# Patient Record
Sex: FEMALE | Race: WHITE | NOT HISPANIC OR LATINO | Employment: STUDENT | URBAN - METROPOLITAN AREA
[De-identification: names, ages, dates, MRNs, and addresses within clinical notes are randomized per-mention and may not be internally consistent; named-entity substitution may affect disease eponyms.]

---

## 2023-03-23 LAB — ANTI-NUCLEAR ANTIBODY (ANA): NEGATIVE

## 2023-03-28 LAB — TRYPTASE: 3.8 MCG/L

## 2023-03-30 LAB — URTICARIA-INDUCING ACTIVITY: 2.3

## 2023-07-10 ENCOUNTER — TELEPHONE (OUTPATIENT)
Dept: PEDIATRICS | Facility: CLINIC | Age: 18
End: 2023-07-10

## 2023-07-10 NOTE — TELEPHONE ENCOUNTER
Ira was wondering if she could get a call with recommendation for an adult Dr as well as an allergist and a chiropractor.

## 2023-07-11 NOTE — TELEPHONE ENCOUNTER
PT CALLED WANTING REFERENCES FOR CHIROPRACTER AND ADULT PCP. LEFT MESSAGE THAT DR JACOBSON AT [facility] IS GOOD AND HE SEES PATIENTS AT [place] AND [place] LOCATIONS AND THE PHONE NUMBER [phone]. A PCP WOULD BE DR BRIGGS THAT PRACTICES IN [place] BUT DO NOT HAVE A PHONE NUMBER FOR HER. OR DR RAFFAELE MILES, A FP DOCTOR, IS IN PRACTICE IN [place] IN OFFICE BUILDING ON [address]OVE [employer] [facility]. IF YOU NEED FURTHER ASSISTANCE THEN CALL MY OFFICE BUT I WILL NOT BE BACK IN OFFICE UNTIL NEXT WEEK.

## 2024-08-19 ENCOUNTER — APPOINTMENT (OUTPATIENT)
Dept: OBSTETRICS AND GYNECOLOGY | Facility: CLINIC | Age: 19
End: 2024-08-19
Payer: COMMERCIAL

## 2024-08-19 VITALS
HEIGHT: 64 IN | BODY MASS INDEX: 34.01 KG/M2 | WEIGHT: 199.2 LBS | DIASTOLIC BLOOD PRESSURE: 72 MMHG | SYSTOLIC BLOOD PRESSURE: 116 MMHG

## 2024-08-19 DIAGNOSIS — Z30.46 NEXPLANON REMOVAL: Primary | ICD-10-CM

## 2024-08-19 PROCEDURE — 11982 REMOVE DRUG IMPLANT DEVICE: CPT | Performed by: STUDENT IN AN ORGANIZED HEALTH CARE EDUCATION/TRAINING PROGRAM

## 2024-08-19 RX ORDER — LIDOCAINE HYDROCHLORIDE 10 MG/ML
2 INJECTION, SOLUTION EPIDURAL; INFILTRATION; INTRACAUDAL; PERINEURAL ONCE
Status: COMPLETED | OUTPATIENT
Start: 2024-08-19 | End: 2024-08-19

## 2024-08-19 ASSESSMENT — PATIENT HEALTH QUESTIONNAIRE - PHQ9
SUM OF ALL RESPONSES TO PHQ9 QUESTIONS 1 & 2: 0
1. LITTLE INTEREST OR PLEASURE IN DOING THINGS: NOT AT ALL
2. FEELING DOWN, DEPRESSED OR HOPELESS: NOT AT ALL

## 2024-08-19 NOTE — PROGRESS NOTES
Subjective   Patient ID [id number]   Ira Silva is a 18 y.o. who presents today for nexplanon removal. Placed 8/10/22. Has been amenorrheic since placement. Feels like she has gained weight and is having mood changes since placement. Strongly desires removal. Not currently sexually active. Was on cOCPs prior to nexplanon for dysmenorrhea with good relief of cramping. Desires break from contraception for now but if becomes sexually active will likely request Rx for cOCPs. Denies HTN, VTE, or migraine with aura.     Objective   Physical Exam  Vitals:    08/19/24 1439   BP: 116/72      Gen: awake, alert  Head: NCAT  HEENT: moist mucus membranes  Pulm: breathing comfortably on room air  CV: warm and well-perfused  Neuro: alert and oriented  Psych: appropriate affect     Insertion of Contraceptive Capsule    Date/Time: 8/19/2024 5:32 PM    Performed by: Yeimi Villagomez MD  Authorized by: Yeimi Villagomez MD    Consent:     Consent obtained:  Written    Consent given by:  Patient    Procedural risks discussed:  Bleeding and infection    Patient questions answered: yes      Patient agrees, verbalizes understanding, and wants to proceed: yes    Indication:     Indication: Presence of non-biodegradable drug delivery implant    Pre-procedure:     Pre-procedure timeout performed: yes      Prepped with: alcohol 70%      Local anesthetic:  Lidocaine 1%    The site was cleaned and prepped in a sterile fashion: yes    Procedure:     Procedure:  Removal    Small stab incision was made in arm: yes      Left/right:  Left    Site was closed with steri-strips and pressure bandage applied: yes    Comments:      Initial stab incision made at site of prior insertion. Nexplanon unable to be removed from this site. With patient consent, second stab incision made 0.5cm proximal to initial incision. Nexplanon was then able to be removed intact through this incision. Incisions closed with steri strips. Pressure bandage applied. Patient  tolerated procedure excellently.      Assessment/Plan     Ira Silva is a 18 y.o. who presents today for nexplanon removal.     Nexplanon Removal  -Desires removal for amenorrhea, weight gain, and mood changes  -Discussed that weight gain and mood changes are uncommon side effects of nexplanon. Patient expressed understanding and still desires removal  -Not currently sexually active but would desire to restart cOCPs if she becomes sexually active or if menses painful. Declines Rx today but will let office know if she desires Rx in the future  -Nexplanon removed at bedside. Required two incisions (first at site of old scar, unable to remove nexplanon through this incision. Second incision made 0.5 cm proximal to original incision). Nexplanon removed intact    Follow up as needed.    Yeimi Villagomez MD

## 2025-10-21 ENCOUNTER — APPOINTMENT (OUTPATIENT)
Facility: CLINIC | Age: 20
End: 2025-10-21
Payer: COMMERCIAL